# Patient Record
Sex: MALE
[De-identification: names, ages, dates, MRNs, and addresses within clinical notes are randomized per-mention and may not be internally consistent; named-entity substitution may affect disease eponyms.]

---

## 2022-04-19 ENCOUNTER — NON-APPOINTMENT (OUTPATIENT)
Age: 62
End: 2022-04-19

## 2022-04-19 PROBLEM — Z00.00 ENCOUNTER FOR PREVENTIVE HEALTH EXAMINATION: Status: ACTIVE | Noted: 2022-04-19

## 2022-05-11 ENCOUNTER — APPOINTMENT (OUTPATIENT)
Dept: NEUROLOGY | Facility: CLINIC | Age: 62
End: 2022-05-11
Payer: COMMERCIAL

## 2022-05-11 VITALS
WEIGHT: 170 LBS | BODY MASS INDEX: 26.68 KG/M2 | HEART RATE: 66 BPM | SYSTOLIC BLOOD PRESSURE: 129 MMHG | HEIGHT: 67 IN | DIASTOLIC BLOOD PRESSURE: 77 MMHG

## 2022-05-11 DIAGNOSIS — R41.9 UNSPECIFIED SYMPTOMS AND SIGNS INVOLVING COGNITIVE FUNCTIONS AND AWARENESS: ICD-10-CM

## 2022-05-11 PROCEDURE — 99205 OFFICE O/P NEW HI 60 MIN: CPT

## 2022-05-11 RX ORDER — ESCITALOPRAM OXALATE 5 MG/1
TABLET, FILM COATED ORAL
Refills: 0 | Status: ACTIVE | COMMUNITY

## 2022-05-11 RX ORDER — ARIPIPRAZOLE 5 MG/1
5 TABLET ORAL
Refills: 0 | Status: ACTIVE | COMMUNITY

## 2022-05-11 RX ORDER — ALPRAZOLAM 2 MG/1
TABLET ORAL
Refills: 0 | Status: ACTIVE | COMMUNITY

## 2022-05-11 NOTE — ASSESSMENT
[FreeTextEntry1] : Phoenix Saldaña is a 62 year old man with prior remote history of depression contracted COVID 19 infection in January followed by tingling in his limbs that prevented him from sleeping, excessive fatigue , and mental exhaustion. He is now finally sleeping better while on zyprexa and admits that tingling in limbs has also improved. However he still feels he has "brain fog" but it does not seem to impact him throught out the day significantly. He is walkign 30 minutes per day and reports no difficulties with completing ADL independantly.  I reviewed extensive prior records including MRI BRain, EMG/NCV, autoimmune progile, SPEP, UPEP, B12 , B1, B6, folate levels that were previously checked. Of significance is a low B12 level in Feb 2022 which has since improved to over 400. Current MOCA examination he scored 26/30  and no other significant defictis\par \par At this time I requested he try to increase his daily physical activity gradually\par He should keep a journal so I can better describe this sense of " brain fog"\par He is reluctant to reduce xanax as he feels it helps mitigate periods of anxiety he still has through out the day\par I requested MRI brain images for review as it only mentioned small vessel ischemic disease but did not mention degree of severity. Neuropsychological evaluation can be considered if he does not feel better by increasing physical activity  in 1 month\par

## 2022-05-11 NOTE — PHYSICAL EXAM
[FreeTextEntry1] : Physical examination \par General: No acute distress, Awake, Alert.   \par  \par \par Mental status \par Awake, alert, and oriented to person, time and place, Normal attention span and concentration, Recent and remote memory intact, Language intact, Fund of knowledge intact.   \par 5/11/22 MoCA 26/30, memory 3/5\par \par Cranial Nerves \par II: VFF  \par III, IV, VI: PERRL, EOMI.   \par V: Facial sensation is normal B/L.   \par VII: Facial strength is normal B/L. \par \par \par VIII: Gross hearing is intact.    \par \par IX, X: Palate is midline and elevates symmetrically.   \par XI: Trapezius normal strength.   \par XII: Tongue midline without atrophy or fasciculations. \par \par Motor exam  \par Muscle tone - no evidence of rigidity or resistance in all 4 extremities.  \par No atrophy or fasciculations \par Muscle Strength: arms and legs, proximal and distal flexors and extensors are normal \par \par No UE drift.\par \par Reflexes  \par \par Diffuse hyporeflexia\par \par  \par Plantars right: mute.   \par Plantars left: mute.   \par  \par \par Coordination \par Finger to nose: Normal.  \par Heel to shin: Normal.   \par  \par \par Sensory \par Intact sensation to PP and cold.\par  Decreased vibration great toe B.\par \par \par Gait \par Normal including heels, toes, and tandem gait.  \par  \par \par

## 2022-05-11 NOTE — HISTORY OF PRESENT ILLNESS
[FreeTextEntry1] : Phoenix Saldaña is a 62 year old man with a history of anxiety and depression presenting for a consultation.\par \par On January 12th 2022 got COVID-19 booster (Moderna) original vaccine Zeb and Zeb started feeling headache for a few days  and had itchy throat and tested positive for COVID-19 January 13th and followed up with doctor on February 11th.  Had decreased appetite in January and February. Has normal taste and smell. One week later, reports tingling in legs and could not sleep. Had a few months without sleeping and tingling in fingertips, lips, and electric shocks which has improved since onset and improved overall 3 weeks ago. Started Zoloft in February with no improvement and had suicidal ideations. Tried Amitriptyline for tingling until April. Describes "brain fog" has not improved and started one week after COVID-19.  Feels he is having delayed recall. Has to write down items now. He is now able to sleep. Feels fatigued. Denies repeating conversations. Was previously running miles now he does yoga or walking for 30 minutes. \par In November 2021-saw ID for high fever and low WBC.\par In February was placed on antidepressants.\par Able to do daily activities. \par Stopped working two weeks ago- was working in [employer] for 12 years. Was fatigued from not sleeping, no mistakes at work. \par Able to do ADLs without difficulty.\par Sleeps 6-7 hours nightly ever since zyprexa added to his medication regimen. Previously not getting sleep or 2-3 hours of sleep. Does not snore. \par Has not become confused driving.\par No speech issues or balance issues.\par Wife has noted he does not get up as early as he used to \par \par Medical history: anxiety and depression. \par More than 10 years ago lost job and was put on antianxiety medications and weaned off. \par Family history: none\par No smoking or alcohol\par \par Sees a PCP and psychiatrist NP. \par \par Current medications:\par low histamine diet\par claritin 10mg\par vitamin D\par xanax prn takes twice per day \par pepcid 20mg\par abilify 5mg \par lexapro 20mg

## 2022-05-11 NOTE — REASON FOR VISIT
[Consultation] : a consultation visit [FreeTextEntry1] : Fatigue, numbness and tingling in both legs

## 2022-05-14 ENCOUNTER — TRANSCRIPTION ENCOUNTER (OUTPATIENT)
Age: 62
End: 2022-05-14

## 2022-06-14 ENCOUNTER — APPOINTMENT (OUTPATIENT)
Dept: NEUROLOGY | Facility: CLINIC | Age: 62
End: 2022-06-14